# Patient Record
Sex: MALE | HISPANIC OR LATINO | Employment: FULL TIME | URBAN - METROPOLITAN AREA
[De-identification: names, ages, dates, MRNs, and addresses within clinical notes are randomized per-mention and may not be internally consistent; named-entity substitution may affect disease eponyms.]

---

## 2018-06-04 ENCOUNTER — OFFICE VISIT (OUTPATIENT)
Dept: RETAIL CLINIC | Facility: CLINIC | Age: 43
End: 2018-06-04

## 2018-06-04 VITALS
SYSTOLIC BLOOD PRESSURE: 126 MMHG | HEIGHT: 64 IN | OXYGEN SATURATION: 98 % | BODY MASS INDEX: 36.7 KG/M2 | DIASTOLIC BLOOD PRESSURE: 84 MMHG | RESPIRATION RATE: 18 BRPM | WEIGHT: 215 LBS | TEMPERATURE: 98.3 F | HEART RATE: 82 BPM

## 2018-06-04 DIAGNOSIS — L23.7 ALLERGIC CONTACT DERMATITIS DUE TO PLANT: Primary | ICD-10-CM

## 2018-06-04 PROCEDURE — 96372 THER/PROPH/DIAG INJ SC/IM: CPT | Performed by: NURSE PRACTITIONER

## 2018-06-04 PROCEDURE — 99203 OFFICE O/P NEW LOW 30 MIN: CPT | Performed by: NURSE PRACTITIONER

## 2018-06-04 RX ORDER — DEXAMETHASONE SODIUM PHOSPHATE 4 MG/ML
4 INJECTION, SOLUTION INTRA-ARTICULAR; INTRALESIONAL; INTRAMUSCULAR; INTRAVENOUS; SOFT TISSUE ONCE
Status: COMPLETED | OUTPATIENT
Start: 2018-06-04 | End: 2018-06-04

## 2018-06-04 RX ORDER — CALAMINE
LOTION (ML) TOPICAL AS NEEDED
Qty: 177 ML | Refills: 0 | Status: SHIPPED | OUTPATIENT
Start: 2018-06-04 | End: 2018-06-11

## 2018-06-04 RX ORDER — PREDNISONE 20 MG/1
20 TABLET ORAL 2 TIMES DAILY
Qty: 14 TABLET | Refills: 0 | Status: SHIPPED | OUTPATIENT
Start: 2018-06-04 | End: 2018-06-11

## 2018-06-04 RX ORDER — DIPHENHYDRAMINE HCL 25 MG
25 CAPSULE ORAL EVERY 6 HOURS PRN
Qty: 20 CAPSULE | Refills: 0 | Status: SHIPPED | OUTPATIENT
Start: 2018-06-04 | End: 2018-06-09

## 2018-06-04 RX ADMIN — DEXAMETHASONE SODIUM PHOSPHATE 4 MG: 4 INJECTION, SOLUTION INTRA-ARTICULAR; INTRALESIONAL; INTRAMUSCULAR; INTRAVENOUS; SOFT TISSUE at 17:24

## 2018-06-04 NOTE — PROGRESS NOTES
"Allen Parker is a 42 y.o. male    CHIEF COMPLAINT  Rash (arms, face, back, legs)      Yesterday, Darrell was planting [employer] and touched poison ivy.        Rash   This is a new problem. The current episode started yesterday. The problem has been gradually worsening since onset. The affected locations include the face, neck, left arm, right arm and abdomen. The rash is characterized by itchiness, redness and swelling. He was exposed to plant contact. Pertinent negatives include no anorexia, congestion, cough, diarrhea, eye pain, facial edema, fatigue, fever, joint pain, nail changes, rhinorrhea, shortness of breath, sore throat or vomiting. Past treatments include anti-itch cream (alcohol & vinegar, aloe vera). The treatment provided mild relief.       The following portions of the patient's history were reviewed and updated as appropriate: allergies, current mediations, past family history, past medical history, past social history, past surgical history, and problem list.    Review of Systems   Constitutional: Negative for chills, fatigue and fever.   HENT: Negative for congestion, ear pain, rhinorrhea, sneezing, sore throat and tinnitus.    Eyes: Positive for itching (left eyelid). Negative for pain and redness.   Respiratory: Negative for cough, shortness of breath and wheezing.    Gastrointestinal: Negative for anorexia, diarrhea, nausea and vomiting.   Musculoskeletal: Negative for joint pain.   Skin: Positive for rash. Negative for nail changes.   Neurological: Negative for dizziness, light-headedness and headaches.         Objective   No Known Allergies      /84 (BP Location: Right arm, Patient Position: Sitting, Cuff Size: Adult)   Pulse 82   Temp 98.3 °F (36.8 °C) (Oral)   Resp 18   Ht 162.6 cm (64\")   Wt 97.5 kg (215 lb)   SpO2 98%   BMI 36.90 kg/m²     Physical Exam   Constitutional: He is oriented to person, place, and time. He appears well-developed and well-nourished. He " appears distressed.   HENT:   Head: Normocephalic.   Left Ear: External ear normal.   Nose: Nose normal.   Mouth/Throat: Uvula is midline, oropharynx is clear and moist and mucous membranes are normal. No uvula swelling. No posterior oropharyngeal erythema.   Eyes: Conjunctivae and EOM are normal. Pupils are equal, round, and reactive to light.       Cardiovascular: Normal rate, regular rhythm and normal heart sounds.    Pulmonary/Chest: Effort normal and breath sounds normal.   Lymphadenopathy:     He has no cervical adenopathy.   Neurological: He is alert and oriented to person, place, and time.   Skin: Capillary refill takes less than 2 seconds. Rash noted.   Erythematous vesicular rash, bilateral sides of neck/ears, left eyelid/periorbital area, bilateral lower forearms, left posterior upper thigh       Assessment/Tamera Ramírez was seen today for rash.    Diagnoses and all orders for this visit:    Allergic contact dermatitis due to plant  -     predniSONE (DELTASONE) 20 MG tablet; Take 1 tablet by mouth 2 (Two) Times a Day for 7 days.  -     diphenhydrAMINE (BENADRYL) 25 mg capsule; Take 1 capsule by mouth Every 6 (Six) Hours As Needed for Itching for up to 5 days. Advised this may cause drowsiness, not to take sedative medications or drink alcohol while taking benadryl.    -     calamine lotion; Apply  topically As Needed for Itching or Irritation for up to 7 days. Advised to stop using alcohol while using calamine, due to possibility of excessive drying of skin.    -     dexamethasone (DECADRON) injection 4 mg;IM 1 (One) Time, administered in left dorsogluteal muscle.    - Wear long sleeves and long pants when working in [employer]  - Lukewarm bath/shower with non-irritating soap  - Advised if swelling around eye/lid does not improve, need to see eye doctor/PCP for further evaluation.       An After Visit Summary was printed, reviewed, and given to the patient. Understanding verbalized and agrees with  treatment plan.  If no improvement or becomes worse, follow up with primary or go to [facility].        June 4, 2018  5:27 PM

## 2018-06-04 NOTE — PATIENT INSTRUCTIONS
Dermatitis por hiedra venenosa  (Poison Ivy Dermatitis)  La dermatitis por hiedra venenosa se caracteriza por el enrojecimiento y el dolor (inflamación) en la piel. Se produce por mallika sustancia química que se encuentra en las hojas de la hiedra venenosa. También puede causar picazón, erupción cutánea y ampollas. Los síntomas suelen desaparecer en 1 a 2 semanas.  Puede contraer esta afección si toca mallika planta de hiedra venenosa. También puede contraerla si toca algo que contenga la sustancia química. [place] incluye tocar animales u objetos que hayan estado en contacto con la planta.  CUIDADOS EN EL HOGAR  Instrucciones generales  · [place] o aplique los medicamentos de venta wendi y los recetados solamente khloe se lo haya indicado el médico.  · Si toca mallika hiedra venenosa, lávese la piel con agua fría y jabón de inmediato.  · Utilice cremas con hidrocortisona o mallika loción con calamina para aliviar la picazón, en tiffanie de que sea necesario.  · [place] vern de ann según sea necesario. Use ann coloidal. Puede conseguirla en la alice de comestibles o en la farmacia local. Siga las instrucciones del envase.  · No se rasque ni se refriegue la piel.  · Mientras tenga erupción cutánea, lave las prendas que use inmediatamente después de sacárselas.  Prevención  · Aprenda a reconocer la hiedra venenosa, para poder evitarla. Esta planta tiene junior hojas, con ramas que florecen de un solo tallo. Las hojas son brillantes. Tienen bordes irregulares que terminan en mallika punta en el frente.  · Si ha tocado mallika hiedra venenosa, lávese con agua y jabón de inmediato. Asegúrese de lavarse debajo de las uñas de las bhupinder.  · Cuando james excursiones o vaya de campamento, use pantalones largos, camisa de mangas largas, medias altas y botas para caminar. También puede colocarse mallika loción en la piel que ayuda a evitar el contacto con la sustancia química de la planta.  · Si navarro que ruiz ropa o el equipo específico para el aire wendi entraron en  contacto con mallika hiedra venenosa, enjuáguelos con mallika manguera de jardín antes de llevarlos a ruiz casa.  SOLICITE AYUDA SI:  · Tiene úlceras abiertas en la samaria de la erupción cutánea.  · Tiene más enrojecimiento, hinchazón o dolor en la samaria afectada.  · Tiene enrojecimiento que se extiende más allá de la samaria de la erupción cutánea.  · Emana líquido, fredi o pus de la samaria afectada.  · Tiene fiebre.  · Tiene mallika erupción cutánea en mallika samaria extensa del cuerpo.  · Tiene mallika erupción cutánea en los ojos, la boca o los genitales.  · La erupción cutánea no mejora después de unos david.  SOLICITE AYUDA DE INMEDIATO SI:  · Se le hincha la jn o se le hinchan los párpados al punto de cerrarse.  · Tiene dificultad para respirar.  · Presenta dificultad para tragar.  Esta información no tiene khloe fin reemplazar el consejo del médico. Asegúrese de hacerle al médico cualquier pregunta que tenga.  Document Released: 04/03/2012 Document Revised: 04/10/2017 Document Reviewed: 05/25/2016  [employer] Interactive Patient Education © 2017 [employer]  Prednisone tablets  ¿Qué es aileen medicamento?  La PREDNISONA es un corticosteroide. Se utiliza para tratar la inflamación de la piel, articulaciones, pulmones y otros órganos. Condiciones comunes tratadas incluyen asma, alergias y artritis. También se utiliza para otras condiciones, tales khloe trastornos sanguíneos o enfermedades de la glándula suprarrenal.  Aileen medicamento puede ser utilizado para otros usos; si tiene alguna pregunta consulte con ruiz proveedor de atención médica o con ruiz farmacéutico.  MARCAS COMUNES: Deltasone, Predone, Sterapred, Sterapred DS  ¿Qué le sourav informar a mi profesional de la elis antes de jonathon aileen medicamento?  Necesita saber si usted presenta alguno de los siguientes problemas o situaciones:  -Síndrome de [place]  -diabetes  -glaucoma  -enfermedad cardiaca  -shaina presión sanguínea  -infección (especialmente infecciones virales, khloe varicela o  herpes)  -enfermedad renal  -enfermedad hepática  -problemas mentales  -miastenia gravis  -osteoporosis  -convulsiones  -problemas estomacales o intestinales  -enfermedad tiroidea  -mallika reacción alérgica o inusual a la lactosa, a la prednisona, a otros medicamentos, alimentos, colorantes o conservantes  -si está embarazada o buscando quedar embarazada  -si está amamantando a un bebé  ¿Cómo sourav utilizar aileen medicamento?  [place] aileen medicamento por vía oral con un vaso de agua. Siga las instrucciones de la etiqueta del medicamento. [place] aileen medicamento con alimentos. Si está tomando aileen medicamento mallika vez por día, tómelo en la mañana. No tome más medicamento que la cantidad indicada. No deje de usar ruiz medicamento repentinamente porque puede desarrollar mallika reacción grave. Ruiz médico le dirá cuánto medicamento jonathon. Si ruiz médico desea que deje de usar el medicamento, es probable que la dosis se vaya disminuyendo lentamente emilie un tiempo para evitar cualquier efecto secundario.  Hable con ruiz pediatra para informarse acerca del uso de aileen medicamento en niños. Puede requerir atención especial.  Sobredosis: Póngase en contacto inmediatamente con un centro toxicológico o mallika ang de urgencia si usted navarro que haya tomado demasiado medicamento.  ATENCIÓN: Aileen medicamento es solo para usted. No comparta aileen medicamento con nadie.  ¿Qué sucede si me olvido de mallika dosis?  Si olvida mallika dosis, tómela tan pronto khloe sea posible. Si es alla la hora de ruiz dosis siguiente, consulte a ruiz médico o a ruiz profesional de la elis. Usted puede necesitar saltar mallika dosis o jonathon mallika dosis adicional. No tome dosis dobles o adicionales sin asesoramiento.  ¿Qué puede interactuar con aileen medicamento?  No tome esta medicina con ninguno de los siguientes medicamentos:  -metirapona  -mifepristona  Esta medicina también puede interactuar con los siguientes medicamentos:  -aminoglutetimida  -anfotericina B  -aspirina o medicamentos  tipo aspirina  -barbitúricos  -ciertos medicamentos para la diabetes, tales khloe glipizida o gliburida  -colestiramina  -inhibidores de colinesterasa  -ciclosporina  -digoxina  -diuréticos  -efedrina  -hormonas femeninas, khloe estrógenos, progestinas o píldoras anticonceptivas  -isoniazida  -quetoconazol  -los IBRAHIMA, medicamentos para el dolor o inflamación, khloe ibuprofeno o naproxeno  -fenitoína  -rifampicina  -toxoide  -vacunas  -warfarina  Puede ser que esta lista no menciona todas las posibles interacciones. Informe a ruiz profesional de la elis de todos los productos a base de hierbas, medicamentos de venta wendi o suplementos nutritivos que esté tomando. Si usted fuma, consume bebidas alcohólicas o si utiliza drogas ilegales, indíqueselo también a ruiz profesional de la elis. Algunas sustancias pueden interactuar con ruiz medicamento.  ¿A qué sourav estar atento al usar aileen medicamento?  Visite a ruiz médico o a ruiz profesional de la elis para chequear ruiz evolución periódicamente. Si noemí aileen medicamento emilie un período prolongado, lleve consigo mallika tarjeta de identificación con ruiz nombre y dirección, el tipo y la dosis del medicamento, y el nombre y la dirección de ruiz médico.  Aileen medicamento puede aumentar ruiz riesgo de contraer mallika infección. Informe a ruiz médico o a ruiz profesional de la elis si está en contacto con personas con sarampión o varicela, o si desarrolla llagas o ampollas que no se curan margaret.  Si va a someterse a mallika operación, informe a ruiz médico o a ruiz profesional de la elis que ha tomado aileen medicamento en los últimos doce meses.  Consulte a ruiz médico o a ruiz profesional de la elis acerca de ruiz dieta.  vez tendrá que reducir la cantidad de sal que consume.  Aileen medicamento puede afectar ruiz nivel de azúcar en la fredi. Si tiene diabetes, consulte a ruiz médico o a ruiz profesional de la elis antes de cambiar ruiz dieta o la dosis de ruiz medicamento para la diabetes.  ¿Qué efectos secundarios  puedo tener al utilizar aileen medicamento?  Efectos secundarios que debe informar a ruiz médico o a ruiz profesional de la elis tan pronto khloe sea posible:  -reacciones alérgicas khloe erupción cutánea, picazón o urticarias, hinchazón de la jn, labios o lengua  -cambios de emociones o humor  -cambios en la visión  -humor deprimido  -dolor ocular  -fiebre o escalofríos, tos, dolor de garganta, dolor o dificultad para orinar  -aumento de sed  -hinchazón de tobillos, pies  Efectos secundarios que, por lo general, no requieren atención médica (debe informarlos a ruiz médico o a ruiz profesional de la elis si persisten o si son molestos):  -confusión, excitación, inquietud  -dolor de saqib  -náuseas, vómito  -problemas en la piel, acné, piel delgada y brillante  -dificultad para conciliar el sueño  -aumento de peso  Puede ser que esta lista no menciona todos los posibles efectos secundarios. Comuníquese a ruiz médico por asesoramiento médico sobre los efectos secundarios. Usted puede informar los efectos secundarios a la FDA por teléfono al 1-800FDA-5259.  ¿Dónde sourav guardar mi medicina?  [place] fuera del alcance de los niños.  Guárdela a temperatura ambiente, entre 15 y 30 grados C (59 y 86 grados F). Protejála gibran. Mantenga el envase margaret cerrado. Deseche los medicamentos que no haya utilizado, después de la fecha de vencimiento.  ATENCIÓN: Aileen folleto es un resumen. Puede ser que no cubra toda la posible información. Si usted tiene preguntas acerca de esta medicina, consulte con ruiz médico, ruiz farmacéutico o ruiz profesional de la elis.  © 2018 [employer] (2018-01-18 00:00:00)

## 2019-01-10 ENCOUNTER — HOSPITAL ENCOUNTER (OUTPATIENT)
Facility: HOSPITAL | Age: 44
Setting detail: OBSERVATION
LOS: 1 days | Discharge: HOME | End: 2019-01-11
Attending: EMERGENCY MEDICINE | Admitting: NURSE PRACTITIONER

## 2019-01-10 ENCOUNTER — APPOINTMENT (OUTPATIENT)
Dept: GENERAL RADIOLOGY | Facility: HOSPITAL | Age: 44
End: 2019-01-10

## 2019-01-10 DIAGNOSIS — T58.91XA TOXIC EFFECT OF CARBON MONOXIDE, UNINTENTIONAL, INITIAL ENCOUNTER: Primary | ICD-10-CM

## 2019-01-10 LAB
ALBUMIN SERPL-MCNC: 4.66 G/DL (ref 3.2–4.8)
ALBUMIN/GLOB SERPL: 1.6 G/DL (ref 1.5–2.5)
ALP SERPL-CCNC: 73 U/L (ref 25–100)
ALT SERPL W P-5'-P-CCNC: 35 U/L (ref 7–40)
ANION GAP SERPL CALCULATED.3IONS-SCNC: 7 MMOL/L (ref 3–11)
ARTERIAL PATENCY WRIST A: ABNORMAL
ARTERIAL PATENCY WRIST A: ABNORMAL
AST SERPL-CCNC: 22 U/L (ref 0–33)
ATMOSPHERIC PRESS: ABNORMAL MMHG
ATMOSPHERIC PRESS: ABNORMAL MMHG
BASE EXCESS BLDA CALC-SCNC: 3 MMOL/L (ref 0–2)
BASE EXCESS BLDA CALC-SCNC: 4.2 MMOL/L (ref 0–2)
BASOPHILS # BLD AUTO: 0.02 10*3/MM3 (ref 0–0.2)
BASOPHILS NFR BLD AUTO: 0.2 % (ref 0–1)
BDY SITE: ABNORMAL
BDY SITE: ABNORMAL
BILIRUB SERPL-MCNC: 0.5 MG/DL (ref 0.3–1.2)
BODY TEMPERATURE: 37 C
BODY TEMPERATURE: 37 C
BUN BLD-MCNC: 15 MG/DL (ref 9–23)
BUN/CREAT SERPL: 16.1 (ref 7–25)
CALCIUM SPEC-SCNC: 9.3 MG/DL (ref 8.7–10.4)
CHLORIDE SERPL-SCNC: 103 MMOL/L (ref 99–109)
CK SERPL-CCNC: 100 U/L (ref 26–174)
CO2 BLDA-SCNC: 28 MMOL/L (ref 23–27)
CO2 BLDA-SCNC: 29.1 MMOL/L (ref 23–27)
CO2 SERPL-SCNC: 28 MMOL/L (ref 20–31)
COHGB MFR BLD: 13.5 % (ref 0–2)
COHGB MFR BLD: 3.6 % (ref 0–2)
CREAT BLD-MCNC: 0.93 MG/DL (ref 0.6–1.3)
D-LACTATE SERPL-SCNC: 0.7 MMOL/L (ref 0.5–2)
DEPRECATED RDW RBC AUTO: 40.6 FL (ref 37–54)
EOSINOPHIL # BLD AUTO: 0.13 10*3/MM3 (ref 0–0.3)
EOSINOPHIL NFR BLD AUTO: 1.6 % (ref 0–3)
ERYTHROCYTE [DISTWIDTH] IN BLOOD BY AUTOMATED COUNT: 12.9 % (ref 11.3–14.5)
GFR SERPL CREATININE-BSD FRML MDRD: 109 ML/MIN/1.73
GFR SERPL CREATININE-BSD FRML MDRD: 90 ML/MIN/1.73
GLOBULIN UR ELPH-MCNC: 2.8 GM/DL
GLUCOSE BLD-MCNC: 84 MG/DL (ref 70–100)
HCO3 BLDA-SCNC: 26.8 MMOL/L (ref 20–26)
HCO3 BLDA-SCNC: 27.9 MMOL/L (ref 20–26)
HCT VFR BLD AUTO: 45.4 % (ref 38.9–50.9)
HCT VFR BLD CALC: 47 %
HCT VFR BLD CALC: 47.1 %
HGB BLD-MCNC: 14.9 G/DL (ref 13.1–17.5)
HGB BLDA-MCNC: 15.3 G/DL (ref 13.5–17.5)
HGB BLDA-MCNC: 15.4 G/DL (ref 13.5–17.5)
HOROWITZ INDEX BLD+IHG-RTO: 21 %
HOROWITZ INDEX BLD+IHG-RTO: 80 %
IMM GRANULOCYTES # BLD AUTO: 0.03 10*3/MM3 (ref 0–0.03)
IMM GRANULOCYTES NFR BLD AUTO: 0.4 % (ref 0–0.6)
LYMPHOCYTES # BLD AUTO: 3.74 10*3/MM3 (ref 0.6–4.8)
LYMPHOCYTES NFR BLD AUTO: 44.6 % (ref 24–44)
MCH RBC QN AUTO: 28.1 PG (ref 27–31)
MCHC RBC AUTO-ENTMCNC: 32.8 G/DL (ref 32–36)
MCV RBC AUTO: 85.7 FL (ref 80–99)
METHGB BLD QL: 0.8 % (ref 0–1.5)
METHGB BLD QL: 1.1 % (ref 0–1.5)
MODALITY: ABNORMAL
MODALITY: ABNORMAL
MONOCYTES # BLD AUTO: 0.36 10*3/MM3 (ref 0–1)
MONOCYTES NFR BLD AUTO: 4.3 % (ref 0–12)
NEUTROPHILS # BLD AUTO: 4.1 10*3/MM3 (ref 1.5–8.3)
NEUTROPHILS NFR BLD AUTO: 48.9 % (ref 41–71)
NOTE: ABNORMAL
NOTE: ABNORMAL
OXYHGB MFR BLDV: 84.5 % (ref 94–99)
OXYHGB MFR BLDV: 95.5 % (ref 94–99)
PCO2 BLDA: 37.6 MM HG
PCO2 BLDA: 38 MM HG
PCO2 TEMP ADJ BLD: 37.6 MM HG (ref 35–48)
PCO2 TEMP ADJ BLD: 38 MM HG (ref 35–48)
PH BLDA: 7.46 PH UNITS (ref 7.35–7.45)
PH BLDA: 7.47 PH UNITS (ref 7.35–7.45)
PH, TEMP CORRECTED: 7.46 PH UNITS
PH, TEMP CORRECTED: 7.47 PH UNITS
PLATELET # BLD AUTO: 277 10*3/MM3 (ref 150–450)
PMV BLD AUTO: 11.4 FL (ref 6–12)
PO2 BLDA: 101 MM HG (ref 83–108)
PO2 BLDA: 258 MM HG (ref 83–108)
PO2 TEMP ADJ BLD: 101 MM HG (ref 83–108)
PO2 TEMP ADJ BLD: 258 MM HG (ref 83–108)
POTASSIUM BLD-SCNC: 3.6 MMOL/L (ref 3.5–5.5)
PROT SERPL-MCNC: 7.5 G/DL (ref 5.7–8.2)
RBC # BLD AUTO: 5.3 10*6/MM3 (ref 4.2–5.76)
SODIUM BLD-SCNC: 138 MMOL/L (ref 132–146)
TROPONIN I SERPL-MCNC: <0.006 NG/ML
TROPONIN I SERPL-MCNC: <0.006 NG/ML
VENTILATOR MODE: ABNORMAL
VENTILATOR MODE: ABNORMAL
WBC NRBC COR # BLD: 8.38 10*3/MM3 (ref 3.5–10.8)

## 2019-01-10 PROCEDURE — 93005 ELECTROCARDIOGRAM TRACING: CPT | Performed by: NURSE PRACTITIONER

## 2019-01-10 PROCEDURE — G0378 HOSPITAL OBSERVATION PER HR: HCPCS

## 2019-01-10 PROCEDURE — 85025 COMPLETE CBC W/AUTO DIFF WBC: CPT | Performed by: NURSE PRACTITIONER

## 2019-01-10 PROCEDURE — 99220 PR INITIAL OBSERVATION CARE/DAY 70 MINUTES: CPT | Performed by: INTERNAL MEDICINE

## 2019-01-10 PROCEDURE — 71046 X-RAY EXAM CHEST 2 VIEWS: CPT

## 2019-01-10 PROCEDURE — 84484 ASSAY OF TROPONIN QUANT: CPT | Performed by: PHYSICIAN ASSISTANT

## 2019-01-10 PROCEDURE — 93005 ELECTROCARDIOGRAM TRACING: CPT | Performed by: PHYSICIAN ASSISTANT

## 2019-01-10 PROCEDURE — 99284 EMERGENCY DEPT VISIT MOD MDM: CPT

## 2019-01-10 PROCEDURE — 82805 BLOOD GASES W/O2 SATURATION: CPT

## 2019-01-10 PROCEDURE — 82550 ASSAY OF CK (CPK): CPT | Performed by: NURSE PRACTITIONER

## 2019-01-10 PROCEDURE — 36415 COLL VENOUS BLD VENIPUNCTURE: CPT | Performed by: PHYSICIAN ASSISTANT

## 2019-01-10 PROCEDURE — 36600 WITHDRAWAL OF ARTERIAL BLOOD: CPT

## 2019-01-10 PROCEDURE — 93010 ELECTROCARDIOGRAM REPORT: CPT | Performed by: INTERNAL MEDICINE

## 2019-01-10 PROCEDURE — 80053 COMPREHEN METABOLIC PANEL: CPT | Performed by: NURSE PRACTITIONER

## 2019-01-10 PROCEDURE — 83605 ASSAY OF LACTIC ACID: CPT | Performed by: NURSE PRACTITIONER

## 2019-01-10 RX ORDER — HEPARIN SODIUM 5000 [USP'U]/ML
5000 INJECTION, SOLUTION INTRAVENOUS; SUBCUTANEOUS EVERY 8 HOURS SCHEDULED
Status: DISCONTINUED | OUTPATIENT
Start: 2019-01-10 | End: 2019-01-11 | Stop reason: HOSPADM

## 2019-01-10 RX ORDER — ACETAMINOPHEN 325 MG/1
650 TABLET ORAL EVERY 4 HOURS PRN
Status: DISCONTINUED | OUTPATIENT
Start: 2019-01-10 | End: 2019-01-11 | Stop reason: HOSPADM

## 2019-01-10 RX ORDER — IBUPROFEN 200 MG
400 TABLET ORAL AS NEEDED
COMMUNITY

## 2019-01-10 RX ORDER — SODIUM CHLORIDE 0.9 % (FLUSH) 0.9 %
3-10 SYRINGE (ML) INJECTION AS NEEDED
Status: DISCONTINUED | OUTPATIENT
Start: 2019-01-10 | End: 2019-01-11 | Stop reason: HOSPADM

## 2019-01-10 RX ORDER — SODIUM CHLORIDE 0.9 % (FLUSH) 0.9 %
10 SYRINGE (ML) INJECTION AS NEEDED
Status: DISCONTINUED | OUTPATIENT
Start: 2019-01-10 | End: 2019-01-11 | Stop reason: HOSPADM

## 2019-01-10 RX ORDER — SODIUM CHLORIDE 0.9 % (FLUSH) 0.9 %
3 SYRINGE (ML) INJECTION EVERY 12 HOURS SCHEDULED
Status: DISCONTINUED | OUTPATIENT
Start: 2019-01-10 | End: 2019-01-11 | Stop reason: HOSPADM

## 2019-01-10 RX ADMIN — SODIUM CHLORIDE, PRESERVATIVE FREE 3 ML: 5 INJECTION INTRAVENOUS at 21:00

## 2019-01-10 NOTE — ED PROVIDER NOTES
Subjective   Darrell Esteban is a 43 y.o.male who presents to the ED with complaints of toxic inhalation. The patient reports his workplace has been exposed to carbon monoxide. 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 He has not taken any medication for his discomfort. He also complains of weakness, dizziness, nausea, and a headache. Additionally, he is here with one of his co-workers which has the same complaint. Furthermore, he states he and his co worker have experienced syncopal episodes while they are at work. Moreover, he states the company he works for is going to fix the heater today to prevent further carbon monoxide exposure. There are no other complaints at this time.         History provided by:  Patient  Illness   Location:  Generalized  Quality:  Toxic inhalation  Severity:  Mild  Onset quality:  Sudden  Duration:  1 day  Timing:  Constant  Progression:  Improving  Chronicity:  New  Context:  Pt has been exposed to carbon monoxide while at work  Relieved by:  None tried  Worsened by:  Nothing  Ineffective treatments:  None tried  Associated symptoms: headaches and nausea        Review of Systems   Gastrointestinal: Positive for nausea.   Neurological: Positive for dizziness, syncope, weakness and headaches.   All other systems reviewed and are negative.      History reviewed. No pertinent past medical history.    No Known Allergies    History reviewed. No pertinent surgical history.    Family History   Problem Relation Age of Onset   • No Known Problems Mother    • No Known Problems Father        Social History     Socioeconomic History   • Marital status:      Spouse name: Not on file   • Number of children: Not on file   • Years of education: Not on file   • Highest education level: Not on file   Tobacco Use   • Smoking status: Former Smoker     Types: Cigarettes     Last attempt to quit: 1/1/2016     Years since  quitting: 3.0   • Smokeless tobacco: Never Used   Substance and Sexual Activity   • Alcohol use: No   • Drug use: No   • Sexual activity: Defer         Objective   Physical Exam   Constitutional: He is oriented to person, place, and time. He appears well-developed and well-nourished. No distress.   HENT:   Head: Normocephalic and atraumatic.   Nose: Nose normal.   Eyes: Conjunctivae are normal. No scleral icterus.   Neck: Normal range of motion. Neck supple.   Cardiovascular: Normal rate, regular rhythm, normal heart sounds and intact distal pulses.   No murmur heard.  Pulmonary/Chest: Effort normal and breath sounds normal. No respiratory distress.   Abdominal: Soft. Bowel sounds are normal. There is no tenderness.   Musculoskeletal: Normal range of motion. He exhibits no edema.   Neurological: He is alert and oriented to person, place, and time.   Skin: Skin is warm and dry.   Psychiatric: He has a normal mood and affect. His behavior is normal.   Nursing note and vitals reviewed.      Procedures         ED Course  ED Course as of Ahsan 10 1851   Thu Ahsan 10, 2019   1821 On hold with [facility] hyperbaric chamber for 15 minutes with no response.   [TB]   1821 Discussed with Dr. Will, pulmonology, who recommends admission until the carbon monoxide clears with a non-rebreather. As of right now, he would not benefit from the hyperbaric chamber.   [TB]   1833 Discussed with Dr. Guajardo, [facility], who states the patient is not a candidate.  [TB]   1849 I spke with Dr. Cheng who will admit.  [JM]      ED Course User Index  [JM] Alon Castellano APRN  [TB] Luc Leon     Recent Results (from the past 24 hour(s))   Blood Gas, Arterial With Co-Ox    Collection Time: 01/10/19  4:43 PM   Result Value Ref Range    Site Right Brachial     Jhoan's Test N/A     pH, Arterial 7.462 (H) 7.350 - 7.450 pH units    pCO2, Arterial 37.6 mm Hg    pO2, Arterial 101.0 83.0 - 108.0 mm Hg    HCO3, Arterial 26.8 (H) 20.0 - 26.0  "mmol/L    Base Excess, Arterial 3.0 (H) 0.0 - 2.0 mmol/L    Hemoglobin, Blood Gas 15.3 13.5 - 17.5 g/dL    Hematocrit, Blood Gas 47.0 %    Oxyhemoglobin 84.5 (L) 94 - 99 %    Methemoglobin 1.10 0.00 - 1.50 %    Carboxyhemoglobin 13.5 (H) 0 - 2 %    CO2 Content 28.0 (H) 23 - 27 mmol/L    Temperature 37.0 C    Barometric Pressure for Blood Gas  mmHg    Modality Room Air     FIO2 21 %    Ventilator Mode       Note      pH, Temp Corrected 7.462 pH Units    pCO2, Temperature Corrected 37.6 35 - 48 mm Hg    pO2, Temperature Corrected 101 83 - 108 mm Hg     Note: In addition to lab results from this visit, the labs listed above may include labs taken at another facility or during a different encounter within the last 24 hours. Please correlate lab times with ED admission and discharge times for further clarification of the services performed during this visit.    XR Chest 2 View   Preliminary Result   No acute cardiopulmonary process.                Vitals:    01/10/19 1212 01/10/19 1613 01/10/19 1815   BP: 125/91 119/79 134/89   BP Location: Left arm Right arm Left arm   Patient Position: Sitting Sitting Sitting   Pulse: 87 72 88   Resp: 16 18 18   Temp: 98.6 °F (37 °C)     TempSrc: Oral     SpO2: 96% 98% 97%   Weight: 90.7 kg (200 lb)     Height: 165.1 cm (65\")       Medications   sodium chloride 0.9 % flush 10 mL (not administered)     ECG/EMG Results (last 24 hours)     ** No results found for the last 24 hours. **                        [facility]    Final diagnoses:   Toxic effect of carbon monoxide, unintentional, initial encounter       Documentation assistance provided by alisha Leon.  Information recorded by the alisha was done at my direction and has been verified and validated by me.     Luc Leon  01/10/19 1615       Alon Castellano APRN  01/10/19 1851    "

## 2019-01-11 VITALS
WEIGHT: 201.4 LBS | DIASTOLIC BLOOD PRESSURE: 72 MMHG | HEART RATE: 68 BPM | BODY MASS INDEX: 33.55 KG/M2 | SYSTOLIC BLOOD PRESSURE: 120 MMHG | TEMPERATURE: 97.8 F | RESPIRATION RATE: 16 BRPM | OXYGEN SATURATION: 100 % | HEIGHT: 65 IN

## 2019-01-11 LAB
ANION GAP SERPL CALCULATED.3IONS-SCNC: 7 MMOL/L (ref 3–11)
ARTERIAL PATENCY WRIST A: ABNORMAL
ATMOSPHERIC PRESS: ABNORMAL MMHG
BASE EXCESS BLDA CALC-SCNC: 4.5 MMOL/L (ref 0–2)
BDY SITE: ABNORMAL
BODY TEMPERATURE: 37 C
BUN BLD-MCNC: 16 MG/DL (ref 9–23)
BUN/CREAT SERPL: 16.5 (ref 7–25)
CALCIUM SPEC-SCNC: 9 MG/DL (ref 8.7–10.4)
CHLORIDE SERPL-SCNC: 105 MMOL/L (ref 99–109)
CO2 BLDA-SCNC: 32.2 MMOL/L (ref 23–27)
CO2 SERPL-SCNC: 28 MMOL/L (ref 20–31)
COHGB MFR BLD: 0.7 % (ref 0–2)
CREAT BLD-MCNC: 0.97 MG/DL (ref 0.6–1.3)
DEPRECATED RDW RBC AUTO: 42.3 FL (ref 37–54)
ERYTHROCYTE [DISTWIDTH] IN BLOOD BY AUTOMATED COUNT: 13.2 % (ref 11.3–14.5)
GFR SERPL CREATININE-BSD FRML MDRD: 103 ML/MIN/1.73
GFR SERPL CREATININE-BSD FRML MDRD: 85 ML/MIN/1.73
GLUCOSE BLD-MCNC: 128 MG/DL (ref 70–100)
HCO3 BLDA-SCNC: 30.6 MMOL/L (ref 20–26)
HCT VFR BLD AUTO: 45.4 % (ref 38.9–50.9)
HCT VFR BLD CALC: 45.5 %
HGB BLD-MCNC: 14.4 G/DL (ref 13.1–17.5)
HGB BLDA-MCNC: 14.8 G/DL (ref 13.5–17.5)
HOROWITZ INDEX BLD+IHG-RTO: 80 %
MCH RBC QN AUTO: 28.1 PG (ref 27–31)
MCHC RBC AUTO-ENTMCNC: 31.7 G/DL (ref 32–36)
MCV RBC AUTO: 88.7 FL (ref 80–99)
METHGB BLD QL: 1.2 % (ref 0–1.5)
MODALITY: ABNORMAL
NOTE: ABNORMAL
OXYHGB MFR BLDV: 98 % (ref 94–99)
PCO2 BLDA: 50.3 MM HG
PCO2 TEMP ADJ BLD: 50.3 MM HG (ref 35–48)
PH BLDA: 7.39 PH UNITS (ref 7.35–7.45)
PH, TEMP CORRECTED: 7.39 PH UNITS
PLATELET # BLD AUTO: 251 10*3/MM3 (ref 150–450)
PMV BLD AUTO: 11.7 FL (ref 6–12)
PO2 BLDA: 335 MM HG (ref 83–108)
PO2 TEMP ADJ BLD: 335 MM HG (ref 83–108)
POTASSIUM BLD-SCNC: 3.8 MMOL/L (ref 3.5–5.5)
RBC # BLD AUTO: 5.12 10*6/MM3 (ref 4.2–5.76)
SODIUM BLD-SCNC: 140 MMOL/L (ref 132–146)
TROPONIN I SERPL-MCNC: <0.006 NG/ML
VENTILATOR MODE: ABNORMAL
WBC NRBC COR # BLD: 7.43 10*3/MM3 (ref 3.5–10.8)

## 2019-01-11 PROCEDURE — 82805 BLOOD GASES W/O2 SATURATION: CPT

## 2019-01-11 PROCEDURE — 36600 WITHDRAWAL OF ARTERIAL BLOOD: CPT

## 2019-01-11 PROCEDURE — 99217 PR OBSERVATION CARE DISCHARGE MANAGEMENT: CPT | Performed by: INTERNAL MEDICINE

## 2019-01-11 PROCEDURE — 93005 ELECTROCARDIOGRAM TRACING: CPT | Performed by: PHYSICIAN ASSISTANT

## 2019-01-11 PROCEDURE — 85027 COMPLETE CBC AUTOMATED: CPT | Performed by: PHYSICIAN ASSISTANT

## 2019-01-11 PROCEDURE — 80048 BASIC METABOLIC PNL TOTAL CA: CPT | Performed by: PHYSICIAN ASSISTANT

## 2019-01-11 PROCEDURE — G0378 HOSPITAL OBSERVATION PER HR: HCPCS

## 2019-01-11 PROCEDURE — 84484 ASSAY OF TROPONIN QUANT: CPT | Performed by: PHYSICIAN ASSISTANT

## 2019-01-11 PROCEDURE — 93010 ELECTROCARDIOGRAM REPORT: CPT | Performed by: INTERNAL MEDICINE

## 2019-01-11 NOTE — DISCHARGE SUMMARY
[facility] Medicine Services  DISCHARGE SUMMARY    Patient Name: Darrell Esteban  : 1975  MRN: [id number]    Date of Admission: 1/10/2019  Date of Discharge:  2019  Primary Care Physician: Provider, No Known    Consults     No orders found for last 30 day(s).          Hospital Course     Presenting Problem:   Carbon monoxide poisoning [T58.91XA]  Toxic effect of carbon monoxide, unintentional, initial encounter [T58.91XA]    Active Hospital Problems    Diagnosis Date Noted   • Carbon monoxide poisoning [T58.91XA] 01/10/2019      Resolved Hospital Problems   No resolved problems to display.          Hospital Course:  Darrell Esteban is a 43 y.o. male with no PMH presented with one week of intermittent fatigue, syncope and headache.  Pt's coworker also had similar symptoms prompting them to suspect they had some toxic exposure. Ultimately deemed to have carbon monoxide poisoning due to faulty gas heater used in enclosed work space. Pt's ABG on admission revealed elevated carboxyhemoglobin.  His EKG was okay and he had no concerning neurologic findings on exam.  He was admitted to our service and placed on high flow O2, ABGs were monitored every 6 hours and improved significantly by this am.  Pt is feeling well and is agreeable for discharge home today. I have advised him to take the weekend off of work, to which he is agreeable.  Pt also needs a PCP and will follow up with he/she in the next week.       Discharge Follow Up Recommendations for labs/diagnostics:   with PCP within one week of discharge.     Day of Discharge     HPI:   Pt doing well this am, denies any issues overnight. Feeling much better.     Review of Systems  Gen- No fevers, chills  CV- No chest pain, palpitations  Resp- No cough, dyspnea  GI- No N/V/D, abd pain    Otherwise ROS is negative except as mentioned in the HPI.    Vital Signs:   Temp:  [96.7 °F (35.9 °C)-98.8 °F (37.1 °C)] 97.8 °F  (36.6 °C)  Heart Rate:  [60-90] 68  Resp:  [14-18] 16  BP: (109-134)/(72-91) 120/72     Physical Exam:  Constitutional: No acute distress, awake, alert  HENT: NCAT, mucous membranes moist  Respiratory: Clear to auscultation bilaterally, respiratory effort normal   Cardiovascular: RRR, no murmurs, rubs, or gallops, palpable pedal pulses bilaterally  Gastrointestinal: Positive bowel sounds, soft, nontender, nondistended  Musculoskeletal: No bilateral ankle edema  Psychiatric: Appropriate affect, cooperative  Neurologic: Oriented x 3, strength symmetric in all extremities, Cranial Nerves grossly intact to confrontation, speech clear  Skin: No rashes      Pertinent  and/or Most Recent Results     Results from last 7 days   Lab Units  01/11/19   0203  01/10/19   1909   WBC 10*3/mm3  7.43  8.38   HEMOGLOBIN g/dL  14.4  14.9   HEMATOCRIT %  45.4  45.4   PLATELETS 10*3/mm3  251  277   SODIUM mmol/L  140  138   POTASSIUM mmol/L  3.8  3.6   CHLORIDE mmol/L  105  103   CO2 mmol/L  28.0  28.0   BUN mg/dL  16  15   CREATININE mg/dL  0.97  0.93   GLUCOSE mg/dL  128*  84   CALCIUM mg/dL  9.0  9.3     Results from last 7 days   Lab Units  01/10/19   1909   BILIRUBIN mg/dL  0.5   ALK PHOS U/L  73   ALT (SGPT) U/L  35   AST (SGOT) U/L  22           Invalid input(s): TG, LDLCALC, LDLREALC  Results from last 7 days   Lab Units  01/11/19   0203  01/10/19   2301  01/10/19   1909   TROPONIN I ng/mL  <0.006  <0.006  <0.006       Brief Urine Lab Results     None          Microbiology Results Abnormal     None          Imaging Results (all)     Procedure Component Value Units Date/Time    XR Chest 2 View [[id number]] Collected:  01/10/19 1836     Updated:  01/11/19 0942    Narrative:       EXAMINATION: XR CHEST 2 VW - 1/10/2019     INDICATION: Carbon monoxide poisoning.      COMPARISON: NONE     FINDINGS: Cardiac size within normal limits. No focal opacification or  consolidation. No pneumothorax or pleural effusion. Degenerative changes  of  the spine.           Impression:       No acute cardiopulmonary process.     DICTATED:   1/10/2019  EDITED/ls :   1/10/2019               This report was finalized on 1/11/2019 9:40 AM by Dr. Christopher Valdivia.                              Discharge Details        Discharge Medications      Continue These Medications      Instructions Start Date   ibuprofen 200 MG tablet  Commonly known as:  ADVIL,MOTRIN   400 mg, Oral, As Needed             No Known Allergies      Discharge Disposition:  Home or Self Care    Discharge Diet:  Diet Order   Procedures   • Diet Regular         Discharge Activity:   Activity Instructions     Other Instructions (Specify)      Patient is to remain off work until Monday, January 14th, 2019          Special Instructions:    CODE STATUS:    Code Status and Medical Interventions:   Ordered at: 01/10/19 1935     Level Of Support Discussed With:    Patient     Code Status:    CPR     Medical Interventions (Level of Support Prior to Arrest):    Full         No future appointments.    Additional Instructions for the Follow-ups that You Need to Schedule     Discharge Follow-up with PCP   As directed       Currently Documented PCP:    Provider, No Known    PCP Phone Number:    [phone]     Follow Up Details:  in one week with PCP               Time Spent on Discharge:  35 minutes    Electronically signed by Nay Baugh MD, 01/11/19, 10:04 AM.

## 2019-01-11 NOTE — PROGRESS NOTES
Discharge Planning Assessment  [facility]     Patient Name: Darrell Esteban  MRN: [id number]  Today's Date: 1/11/2019    Admit Date: 1/10/2019    Discharge Needs Assessment     Row Name 01/11/19 1112       Living Environment    Lives With  spouse    Current Living Arrangements  home/apartment/condo    Primary Care Provided by  self    Provides Primary Care For  no one    Family Caregiver if Needed  spouse    Quality of Family Relationships  involved;supportive    Able to Return to Prior Arrangements  yes       Resource/Environmental Concerns    Resource/Environmental Concerns  none       Transition Planning    Patient/Family Anticipates Transition to  home with family    Patient/Family Anticipated Services at Transition  none    Transportation Anticipated  family or friend will provide       Discharge Needs Assessment    Readmission Within the Last 30 Days  no previous admission in last 30 days    Concerns to be Addressed  no discharge needs identified    Equipment Currently Used at Home  none    Anticipated Changes Related to Illness  none    Equipment Needed After Discharge  none        Discharge Plan     Row Name 01/11/19 1112       Plan    Plan  Home    Patient/Family in Agreement with Plan  yes    Plan Comments  Spoke with patient's wife in room to initaite discharge planning.  Patient present, but doesn't speak much English.  He lives with her in [facility].  He is independent with ADL's.  He has no DME at home and has never had home health.  Mr. Esteban does not have RX coverage.  His plan is to return home with his wife at discharge.  They deny any needs at this time.  CM will continue to follow.  Spring Estrada RN [phone]    Final Discharge Disposition Code  01 - home or self-care        Destination      No service coordination in this encounter.      Durable Medical Equipment      No service coordination in this encounter.      Dialysis/Infusion      No service coordination in this  encounter.      Home Medical Care      No service coordination in this encounter.      Community Resources      No service coordination in this encounter.        Expected Discharge Date and Time     Expected Discharge Date Expected Discharge Time    Jan 11, 2019         Demographic Summary     Row Name 01/11/19 1111       General Information    Admission Type  observation    Arrived From  emergency department    Referral Source  admission list    Reason for Consult  discharge planning    Preferred Language  [place]     Used During This Interaction  no        Functional Status     Row Name 01/11/19 1111       Functional Status    Usual Activity Tolerance  excellent    Current Activity Tolerance  excellent       Functional Status, IADL    Medications  independent    Meal Preparation  independent    Housekeeping  independent    Laundry  independent    Shopping  independent        Psychosocial    No documentation.       Abuse/Neglect    No documentation.       Legal    No documentation.       Substance Abuse    No documentation.       Patient Forms    No documentation.           Spring Estrada RN

## 2019-01-11 NOTE — PLAN OF CARE
Problem: Patient Care Overview  Goal: Plan of Care Review  Outcome: Ongoing (interventions implemented as appropriate)   01/11/19 0051   Coping/Psychosocial   Plan of Care Reviewed With patient;spouse   Plan of Care Review   Progress improving   OTHER   Outcome Summary patient admitted to the unit this shift. pt currently on 100% non rebreather r/t carbon monoxide poisoning, ABG and EKG showing improvement, pt spouse at bedside and interpreting for patient, language Ipad also in use.

## 2019-01-11 NOTE — PLAN OF CARE
Problem: Breathing Pattern Ineffective (Adult)  Goal: Identify Related Risk Factors and Signs and Symptoms  Outcome: Ongoing (interventions implemented as appropriate)   01/11/19 0457   Breathing Pattern Ineffective (Adult)   Related Risk Factors (Breathing Pattern Ineffective) other (see comments)  (working in enclosed area)   Signs and Symptoms (Breathing Pattern Ineffective) breathing pattern altered;cough ineffective     Goal: Effective Oxygenation/Ventilation  Outcome: Ongoing (interventions implemented as appropriate)   01/11/19 0163   Breathing Pattern Ineffective (Adult)   Effective Oxygenation/Ventilation making progress toward outcome

## 2019-01-11 NOTE — H&P
[facility] Medicine Services  HISTORY AND PHYSICAL    Patient Name: Darrell Esteban  : 1975  MRN: [id number]  Primary Care Physician: Provider, No Known  Date of admission: 1/10/2019      Subjective   Subjective     Chief Complaint:  Syncope and fatigue    HPI:  Darrell Esteban is a 43 y.o. male with no know medical problems who presents to the ED due to a 1 week history of intermittent fatigue syncope and headache.  Patient states that he originally began to feel ill 1 week ago, last Thursday. He experienced headaches, fatigue and a syncopal episode at that time yesterday he began to feel similarly today while at work, patient was feeling poorly as well as a coworker.  His coworker had a syncopal episode and they became concerned for toxic exposure.  He had been using a gas heater in his work space which was found to be malfunctioning and exposing them to carbon monoxide.    Review of Systems     Otherwise 10-system ROS reviewed and is negative except as mentioned in the HPI.    Personal History     History reviewed. No pertinent past medical history.    History reviewed. No pertinent surgical history.    Family History: family history includes No Known Problems in his father and mother. Otherwise pertinent FHx was reviewed and unremarkable.     Social History:  reports that he quit smoking about 3 years ago. His smoking use included cigarettes. he has never used smokeless tobacco. He reports that he does not drink alcohol or use drugs.  Social History     Social History Narrative   • Not on file       Medications:    Available home medication information reviewed.    (Not in a hospital admission)    No Known Allergies    Objective   Objective     Vital Signs:   Temp:  [98.6 °F (37 °C)-98.8 °F (37.1 °C)] 98.6 °F (37 °C)  Heart Rate:  [72-90] 88  Resp:  [14-18] 18  BP: (118-134)/(78-91) 134/89        Physical Exam   Constitutional: No acute distress, awake,  alert  Eyes: PERRLA, sclerae anicteric, bilateral conjunctival injection  HENT: NCAT, mucous membranes moist  Neck: Supple, no thyromegaly, no lymphadenopathy, trachea midline  Respiratory: Clear to auscultation bilaterally, nonlabored respirations   Cardiovascular: RRR, no murmurs, rubs, or gallops, palpable pedal pulses bilaterally  Gastrointestinal: Positive bowel sounds, soft, nontender, nondistended  Musculoskeletal: No bilateral ankle edema, no clubbing or cyanosis to extremities  Psychiatric: Appropriate affect, cooperative  Neurologic: Oriented x 3, strength symmetric in all extremities, Cranial Nerves grossly intact to confrontation, speech clear  Skin: No rashes    Results Reviewed:  I have personally reviewed current lab, radiology, and data and agree.    Results from last 7 days   Lab Units  01/10/19   1909   WBC 10*3/mm3  8.38   HEMOGLOBIN g/dL  14.9   HEMATOCRIT %  45.4   PLATELETS 10*3/mm3  277           Invalid input(s):  ALKPHOS  CrCl cannot be calculated (No order found.).  Brief Urine Lab Results     None        No results found for: BNP  Imaging Results (last 24 hours)     Procedure Component Value Units Date/Time    XR Chest 2 View [[id number]] Collected:  01/10/19 1836     Updated:  01/10/19 1839    Narrative:          EXAMINATION: XR CHEST 2 VW-      INDICATION: carbon monoxide      COMPARISON: NONE     FINDINGS: Cardiac size within normal limits. No focal opacification or  consolidation. No pneumothorax or pleural effusion. Degenerative changes  of the spine.           Impression:       No acute cardiopulmonary process.                   Assessment/Plan   Assessment / Plan     Active Hospital Problems    Diagnosis Date Noted   • Carbon monoxide poisoning [T58.91XA] 01/10/2019     This is a 43-year-old male patient with no known medical problems who presents to the ED due to syncope, fatigue and headache found to have carbon monoxide poisoning    Carbon monoxide poisoning  --ABG with elevated  carboxyhemoglobin in decreased oxyhemoglobin.  --We will trend ABG every 6 hours  --case discussed with [facility] for evaluation forhyperbaric chamber, patient does not meet criteria and recommendations are for high flow oxygen support  --Patient to remain 100% nonrebreather overnight  --EKG without signs of ischemia  --Will get troponin  --Baseline CBC, CMP  --every 4 hour neuro checks    DVT prophylaxis:  heparin    CODE STATUS:    Code Status and Medical Interventions:   Ordered at: 01/10/19 1937     Level Of Support Discussed With:    Patient     Code Status:    CPR     Medical Interventions (Level of Support Prior to Arrest):    Full       Admission Status:  I believe this patient meets INPATIENT status due to the need for care which can only be reasonably provided in an hospital setting such as possible need for aggressive/expedited ancillary services and/or consultation services, IV medications, close physician monitoring, and/or procedures.  In such, I feel patient’s risk for adverse outcomes and need for care warrant INPATIENT evaluation and predict the patient’s care encounter to likely last beyond 2 midnights.    Electronically signed by Mary Carranza DO, 01/10/19, 7:55 PM.

## 2019-01-11 NOTE — NURSING NOTE
Pt's primary language is [place].  Asked if pt needed or would like an  and pt declined.  His wife is at bedside and is interpreting as needed. Completed database questions by asking pt himself with wife at bedside.

## 2019-01-11 NOTE — DISCHARGE INSTR - APPOINTMENTS
Workman's comp is handling this case. Patient is to go to [facility] for 1 week follow up post discharge from [facility].      Address is:  [address] [place], [place] [id number]